# Patient Record
Sex: FEMALE | HISPANIC OR LATINO | URBAN - METROPOLITAN AREA
[De-identification: names, ages, dates, MRNs, and addresses within clinical notes are randomized per-mention and may not be internally consistent; named-entity substitution may affect disease eponyms.]

---

## 2021-12-06 ENCOUNTER — OFFICE (OUTPATIENT)
Dept: URBAN - METROPOLITAN AREA CLINIC 79 | Facility: CLINIC | Age: 46
End: 2021-12-06

## 2021-12-06 VITALS
HEART RATE: 56 BPM | DIASTOLIC BLOOD PRESSURE: 87 MMHG | SYSTOLIC BLOOD PRESSURE: 131 MMHG | TEMPERATURE: 96.8 F | HEIGHT: 64 IN | WEIGHT: 152 LBS

## 2021-12-06 DIAGNOSIS — Z12.11 ENCOUNTER FOR SCREENING FOR MALIGNANT NEOPLASM OF COLON: ICD-10-CM

## 2021-12-06 DIAGNOSIS — R79.89 OTHER SPECIFIED ABNORMAL FINDINGS OF BLOOD CHEMISTRY: ICD-10-CM

## 2021-12-06 DIAGNOSIS — K76.0 FATTY (CHANGE OF) LIVER, NOT ELSEWHERE CLASSIFIED: ICD-10-CM

## 2021-12-06 PROCEDURE — 99204 OFFICE O/P NEW MOD 45 MIN: CPT | Performed by: PHYSICIAN ASSISTANT

## 2021-12-06 NOTE — SERVICEHPINOTES
KAMALA MONTANO   is a   47 yo white    female who is being seen in consultation at the request of   SANDRA BROWN   for ov prior to colonoscopy.
No prior colonoscopy. No fm h/o CRC. She has daily BMs, well forms: No blood in stool. No known fm ho allergy to anesthesia. Rare NSAID use. Denies chest pain, abdominal pain, change in BMs. melena, BRBPR, weight loss. br
nayan
br
Also here for elevated LFTs found by PCP on routine lab work. She has h/o high TGL. Recent RUQ u/s in 10/29/2021 at [facility] noted evidence of hepatic steatosis. No ETOH abuse. No known h/o viral hepatitis. No fm h/o liver disease. Denies n/v,  jaundice, abdominal pain. nayan spicer

## 2021-12-20 ENCOUNTER — OFFICE (OUTPATIENT)
Dept: URBAN - METROPOLITAN AREA CLINIC 102 | Facility: CLINIC | Age: 46
End: 2021-12-20

## 2021-12-20 DIAGNOSIS — R74.8 ABNORMAL LEVELS OF OTHER SERUM ENZYMES: ICD-10-CM

## 2021-12-20 PROCEDURE — 91200 LIVER ELASTOGRAPHY: CPT | Performed by: INTERNAL MEDICINE

## 2022-02-04 ENCOUNTER — AMBULATORY SURGICAL CENTER (OUTPATIENT)
Dept: URBAN - METROPOLITAN AREA SURGERY 23 | Facility: SURGERY | Age: 47
End: 2022-02-04
Payer: COMMERCIAL

## 2022-02-04 DIAGNOSIS — Z12.11 ENCOUNTER FOR SCREENING FOR MALIGNANT NEOPLASM OF COLON: ICD-10-CM

## 2022-02-04 PROCEDURE — 45378 DIAGNOSTIC COLONOSCOPY: CPT | Mod: PT | Performed by: INTERNAL MEDICINE

## 2023-03-14 LAB
HEPATIC FUNCTION PANEL (7): ALBUMIN: 4.5 G/DL (ref 3.8–4.8)
HEPATIC FUNCTION PANEL (7): ALKALINE PHOSPHATASE: 127 IU/L — HIGH (ref 44–121)
HEPATIC FUNCTION PANEL (7): ALT (SGPT): 93 IU/L — HIGH (ref 0–32)
HEPATIC FUNCTION PANEL (7): AST (SGOT): 31 IU/L (ref 0–40)
HEPATIC FUNCTION PANEL (7): BILIRUBIN, DIRECT: <0.1 MG/DL
HEPATIC FUNCTION PANEL (7): BILIRUBIN, TOTAL: 0.2 MG/DL (ref 0–1.2)
HEPATIC FUNCTION PANEL (7): PROTEIN, TOTAL: 7 G/DL (ref 6–8.5)

## 2023-03-16 ENCOUNTER — OFFICE (OUTPATIENT)
Dept: URBAN - METROPOLITAN AREA CLINIC 102 | Facility: CLINIC | Age: 48
End: 2023-03-16

## 2023-03-16 DIAGNOSIS — K76.0 FATTY (CHANGE OF) LIVER, NOT ELSEWHERE CLASSIFIED: ICD-10-CM

## 2023-03-16 DIAGNOSIS — R74.8 ABNORMAL LEVELS OF OTHER SERUM ENZYMES: ICD-10-CM

## 2023-03-16 PROCEDURE — 76981 USE PARENCHYMA: CPT | Performed by: INTERNAL MEDICINE

## 2023-04-20 ENCOUNTER — OFFICE (OUTPATIENT)
Dept: URBAN - METROPOLITAN AREA CLINIC 79 | Facility: CLINIC | Age: 48
End: 2023-04-20

## 2023-04-20 VITALS
TEMPERATURE: 98.1 F | HEART RATE: 59 BPM | SYSTOLIC BLOOD PRESSURE: 115 MMHG | DIASTOLIC BLOOD PRESSURE: 76 MMHG | HEIGHT: 64 IN | WEIGHT: 151 LBS

## 2023-04-20 DIAGNOSIS — R79.89 OTHER SPECIFIED ABNORMAL FINDINGS OF BLOOD CHEMISTRY: ICD-10-CM

## 2023-04-20 DIAGNOSIS — Z12.11 ENCOUNTER FOR SCREENING FOR MALIGNANT NEOPLASM OF COLON: ICD-10-CM

## 2023-04-20 DIAGNOSIS — K76.0 FATTY (CHANGE OF) LIVER, NOT ELSEWHERE CLASSIFIED: ICD-10-CM

## 2023-04-20 PROCEDURE — 99214 OFFICE O/P EST MOD 30 MIN: CPT | Performed by: PHYSICIAN ASSISTANT

## 2023-04-20 NOTE — SERVICEHPINOTES
KAMALA MONTANO   is a   48 yo white  female (Turkish speaking) who complains of KIRKLAND. Last seen in 2021 for initial evaluation of elevated LFTs that led to Fibroscan noting S3/F1. Most recent Fibroscan from 03/2023 noting S3/F2. Reviewed labs noting mildly elevated LFTs in the 50s range and alk phos 140s. She has thyroid disease on levothyroxine. No h/o DMT2. No ETOH use. Denies chest pain, n/v, abdominal pain, change in BMs, melena, weight loss. Patient made aware of CRC screening colonoscopy given age. 
br She has daily BMs, well formed: no BRBPR. br No fm h/o CRC or colonic polyps. br No known cardiac or pulmonary disease. br

## 2025-04-18 ENCOUNTER — OFFICE (OUTPATIENT)
Dept: URBAN - METROPOLITAN AREA CLINIC 34 | Facility: CLINIC | Age: 50
End: 2025-04-18
Payer: COMMERCIAL

## 2025-04-18 VITALS
WEIGHT: 138 LBS | SYSTOLIC BLOOD PRESSURE: 116 MMHG | DIASTOLIC BLOOD PRESSURE: 48 MMHG | HEART RATE: 53 BPM | TEMPERATURE: 97.7 F | HEIGHT: 64 IN

## 2025-04-18 DIAGNOSIS — R79.89 OTHER SPECIFIED ABNORMAL FINDINGS OF BLOOD CHEMISTRY: ICD-10-CM

## 2025-04-18 DIAGNOSIS — K76.0 FATTY (CHANGE OF) LIVER, NOT ELSEWHERE CLASSIFIED: ICD-10-CM

## 2025-04-18 PROCEDURE — 99214 OFFICE O/P EST MOD 30 MIN: CPT

## 2025-04-18 NOTE — SERVICEHPINOTES
KAMALA MONTANO   is a   49  white  female (Bolivian speaking) who is accompanied by her daughter that provides translation, complains of KIRKLAND. Last seen in 2023 for evaluation of elevated LFTs that led to Fibroscan noting S3/F1 in 2021. Most recent Fibroscan from 03/2023 noting S3/F2. Reviewed labs noting mildly elevated LFTs (ALT: 55, the remaining of LFTs wnl)
br
brShe has thyroid disease on levothyroxine. No h/o T2D. No ETOH use. Denies chest pain, nausea and vomiting, abdominal pain, change in BMs, melena, weight loss. No hx of hepatitis, no blood transfusion.  Colonoscopy done in 2/2022 and unremarkable, advised for recall in 10 years.brShe has daily BMs, well formed: no BRBPR. brNo fm h/o CRC or colonic polyps. brNo known cardiac or pulmonary disease.